# Patient Record
Sex: MALE | Race: WHITE | HISPANIC OR LATINO | URBAN - METROPOLITAN AREA
[De-identification: names, ages, dates, MRNs, and addresses within clinical notes are randomized per-mention and may not be internally consistent; named-entity substitution may affect disease eponyms.]

---

## 2017-06-10 ENCOUNTER — EMERGENCY (EMERGENCY)
Facility: HOSPITAL | Age: 27
LOS: 1 days | Discharge: HOME | End: 2017-06-10
Attending: EMERGENCY MEDICINE | Admitting: EMERGENCY MEDICINE
Payer: MEDICAID

## 2017-06-10 DIAGNOSIS — Z79.891 LONG TERM (CURRENT) USE OF OPIATE ANALGESIC: ICD-10-CM

## 2017-06-10 DIAGNOSIS — F10.129 ALCOHOL ABUSE WITH INTOXICATION, UNSPECIFIED: ICD-10-CM

## 2017-06-10 DIAGNOSIS — Z91.013 ALLERGY TO SEAFOOD: ICD-10-CM

## 2017-06-10 DIAGNOSIS — R41.82 ALTERED MENTAL STATUS, UNSPECIFIED: ICD-10-CM

## 2017-06-10 DIAGNOSIS — Z79.2 LONG TERM (CURRENT) USE OF ANTIBIOTICS: ICD-10-CM

## 2017-06-10 PROCEDURE — 99283 EMERGENCY DEPT VISIT LOW MDM: CPT | Mod: 25

## 2017-06-10 PROCEDURE — 99053 MED SERV 10PM-8AM 24 HR FAC: CPT

## 2017-06-11 VITALS
RESPIRATION RATE: 17 BRPM | TEMPERATURE: 98 F | HEART RATE: 74 BPM | DIASTOLIC BLOOD PRESSURE: 71 MMHG | OXYGEN SATURATION: 97 % | SYSTOLIC BLOOD PRESSURE: 113 MMHG

## 2017-06-11 VITALS
DIASTOLIC BLOOD PRESSURE: 76 MMHG | HEART RATE: 67 BPM | SYSTOLIC BLOOD PRESSURE: 118 MMHG | TEMPERATURE: 98 F | RESPIRATION RATE: 16 BRPM | OXYGEN SATURATION: 99 %
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
## 2017-06-11 NOTE — ED ADULT NURSE NOTE - CHPI ED SYMPTOMS NEG
no confusion/no disorientation/no vomiting/no weakness/no nausea/no abdominal pain/no fever/no chills/no abdominal distension

## 2017-06-11 NOTE — ED ADULT NURSE REASSESSMENT NOTE - NS ED NURSE REASSESS COMMENT FT1
pt. had some water and juice, tolerated well, pt. is sleeping, responds to all stimuli, breathing unlabored, no distress, no c/o pain, will monitor.
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
## 2017-06-11 NOTE — ED PROVIDER NOTE - MEDICAL DECISION MAKING DETAILS
clinically intoxicated without trauma.  observed for sobriety.  alert and oriented x3 with steady gait at time of discharge
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
## 2017-06-11 NOTE — ED PROVIDER NOTE - PHYSICAL EXAMINATION
· CONSTITUTIONAL: smells of alcohol, well nourished, awake, alert, oriented to person,  and in no apparent distress.  · ENMT: Airway patent, Nasal mucosa clear. Mouth with normal mucosa.  · HEAD: Head is atraumatic. Head shape is symmetrical.  · EYES: Clear bilaterally, pupils equal, round and reactive to light.  · CARDIAC: Normal rate, regular rhythm.  Heart sounds S1, S2.  · RESPIRATORY: Breath sounds clear and equal bilaterally.  · GASTROINTESTINAL: Abdomen soft, no guarding.  · MUSCULOSKELETAL: Spine appears normal, range of motion is not limited  · NEUROLOGICAL: Alert and oriented to self, speech slurred, not following formal neuro exam  · SKIN: Skin normal color for race, warm, dry and intact. No evidence of rash.  · PSYCHIATRIC: Alert and oriented to person. intoxicated affect. no apparent risk to self or others.  · HEME LYMPH: No adenopathy

## 2019-08-10 ENCOUNTER — EMERGENCY (EMERGENCY)
Facility: HOSPITAL | Age: 29
LOS: 1 days | Discharge: HOME | End: 2019-08-10
Attending: EMERGENCY MEDICINE | Admitting: EMERGENCY MEDICINE
Payer: COMMERCIAL

## 2019-08-10 VITALS
SYSTOLIC BLOOD PRESSURE: 148 MMHG | HEART RATE: 79 BPM | TEMPERATURE: 98 F | DIASTOLIC BLOOD PRESSURE: 78 MMHG | RESPIRATION RATE: 19 BRPM | OXYGEN SATURATION: 100 %

## 2019-08-10 VITALS
TEMPERATURE: 98 F | HEART RATE: 80 BPM | SYSTOLIC BLOOD PRESSURE: 112 MMHG | OXYGEN SATURATION: 98 % | RESPIRATION RATE: 16 BRPM | DIASTOLIC BLOOD PRESSURE: 63 MMHG

## 2019-08-10 DIAGNOSIS — F10.129 ALCOHOL ABUSE WITH INTOXICATION, UNSPECIFIED: ICD-10-CM

## 2019-08-10 DIAGNOSIS — R41.82 ALTERED MENTAL STATUS, UNSPECIFIED: ICD-10-CM

## 2019-08-10 PROCEDURE — 82962 GLUCOSE BLOOD TEST: CPT

## 2019-08-10 PROCEDURE — 99285 EMERGENCY DEPT VISIT HI MDM: CPT

## 2019-08-10 PROCEDURE — 99283 EMERGENCY DEPT VISIT LOW MDM: CPT

## 2019-08-10 NOTE — ED PROVIDER NOTE - PROGRESS NOTE DETAILS
Pt observed in ED. Walking around ED with steady gait and taking sandwiches Pt observed in ED. Walking around ED with steady gait and taking sandwiches from refrigerator. Discharged with outpatient follow up.
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
## 2019-08-10 NOTE — ED PROVIDER NOTE - NSFOLLOWUPCLINICS_GEN_ALL_ED_FT
[facility]  Family Medicine  [address]  [place], [place] [address]  Phone: [phone]  Fax:   Follow Up Time: 4-6 Days

## 2019-08-10 NOTE — ED PROVIDER NOTE - CONSTITUTIONAL, MLM
normal... Well appearing, well nourished, awake, alert, oriented and in no apparent distress. Head is atraumatic.
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
## 2019-08-16 ENCOUNTER — EMERGENCY (EMERGENCY)
Facility: HOSPITAL | Age: 29
LOS: 1 days | Discharge: HOME | End: 2019-08-16
Attending: EMERGENCY MEDICINE | Admitting: EMERGENCY MEDICINE
Payer: COMMERCIAL

## 2019-08-16 VITALS
RESPIRATION RATE: 18 BRPM | OXYGEN SATURATION: 95 % | WEIGHT: 184.97 LBS | SYSTOLIC BLOOD PRESSURE: 130 MMHG | DIASTOLIC BLOOD PRESSURE: 68 MMHG | TEMPERATURE: 98 F | HEART RATE: 113 BPM | HEIGHT: 66 IN

## 2019-08-16 VITALS
RESPIRATION RATE: 16 BRPM | DIASTOLIC BLOOD PRESSURE: 85 MMHG | SYSTOLIC BLOOD PRESSURE: 117 MMHG | OXYGEN SATURATION: 98 % | HEART RATE: 74 BPM | TEMPERATURE: 97 F

## 2019-08-16 PROCEDURE — 99283 EMERGENCY DEPT VISIT LOW MDM: CPT | Mod: 25

## 2019-08-16 PROCEDURE — 82962 GLUCOSE BLOOD TEST: CPT

## 2019-08-16 PROCEDURE — 73030 X-RAY EXAM OF SHOULDER: CPT

## 2019-08-16 PROCEDURE — 99053 MED SERV 10PM-8AM 24 HR FAC: CPT

## 2019-08-16 PROCEDURE — 73030 X-RAY EXAM OF SHOULDER: CPT | Mod: 26,RT

## 2019-08-16 PROCEDURE — 99285 EMERGENCY DEPT VISIT HI MDM: CPT

## 2019-08-16 PROCEDURE — 73030 X-RAY EXAM OF SHOULDER: CPT | Mod: 26

## 2019-08-16 RX ORDER — IBUPROFEN 200 MG
600 TABLET ORAL ONCE
Refills: 0 | Status: COMPLETED | OUTPATIENT
Start: 2019-08-16 | End: 2019-08-16

## 2019-08-16 RX ADMIN — Medication 600 MILLIGRAM(S): at 03:31

## 2019-08-16 RX ADMIN — Medication 600 MILLIGRAM(S): at 04:15

## 2019-08-16 NOTE — ED ADULT TRIAGE NOTE - CHIEF COMPLAINT QUOTE
Presents to ED for AMS.  States, "I want detox from alcohol, my last drink was 30 minutes ago.  I was drinking vodka."  Denies CP, SOB, vomiting, dizziness, abd pain, LOC, trauma.

## 2019-08-16 NOTE — ED PROVIDER NOTE - OBJECTIVE STATEMENT
29M hx etoh abuse, lupus, stating he is intoxicated. admits to drinking daily.  c/o R shoulder pain.  states ongoing for a few days, no trauma.

## 2019-08-16 NOTE — ED PROVIDER NOTE - DIAGNOSTIC INTERPRETATION
ER Physician: Le  INTERPRETATION:  no acute fracture; no soft tissue swelling noted; normal bony alignment.

## 2019-08-16 NOTE — ED PROVIDER NOTE - CARE PLAN
Principal Discharge DX:	Alcoholic intoxication without complication  Secondary Diagnosis:	Acute pain of right shoulder

## 2019-08-16 NOTE — ED PROVIDER NOTE - CLINICAL SUMMARY MEDICAL DECISION MAKING FREE TEXT BOX
etoh intox, no evidence of head injury, c/o R shoulder pain  -check xray  -reassess when clinically sober

## 2019-08-16 NOTE — ED ADULT NURSE NOTE - CHPI ED NUR SYMPTOMS NEG
no loss of consciousness/no numbness/no confusion/no blurred vision/no weakness/no change in level of consciousness/no dizziness/no nausea/no fever/no vomiting

## 2019-08-16 NOTE — ED PROVIDER NOTE - NSFOLLOWUPINSTRUCTIONS_ED_ALL_ED_FT
Alcohol Abuse    Alcohol intoxication occurs when the amount of alcohol that a person has consumed impairs his or her ability to mentally and physically function. Chronic alcohol consumption can also lead to a variety of health issues including neurological disease, stomach disease, heart disease, liver disease, etc. Do not drive after drinking alcohol. Drinking enough alcohol to end up in an Emergency Room suggests you may have an alcohol abuse problem. Seek help at a drug addiction center.    SEEK IMMEDIATE MEDICAL CARE IF YOU HAVE ANY OF THE FOLLOWING SYMPTOMS: seizures, vomiting blood, blood in your stool, lightheadedness/dizziness, or becoming shaky to tremulous when you stop drinking.     Shoulder Pain  Many things can cause shoulder pain, including:  An injury to the area.  Overuse of the shoulder.  Arthritis.  The source of the pain can be:  Inflammation.  An injury to the shoulder joint.  An injury to a tendon, ligament, or bone.  Follow these instructions at home:  Take these actions to help with your pain:  Squeeze a soft ball or a foam pad as much as possible. This helps to keep the shoulder from swelling. It also helps to strengthen the arm.  Take over-the-counter and prescription medicines only as told by your health care provider.  If directed, apply ice to the area:  Put ice in a plastic bag.  Place a towel between your skin and the bag.  Leave the ice on for 20 minutes, 2–3 times per day. Stop applying ice if it does not help with the pain.  If you were given a shoulder sling or immobilizer:  Wear it as told.  Remove it to shower or bathe.  Move your arm as little as possible, but keep your hand moving to prevent swelling.  Contact a health care provider if:  Your pain gets worse.  Your pain is not relieved with medicines.  New pain develops in your arm, hand, or fingers.  Get help right away if:  Your arm, hand, or fingers:  Tingle.  Become numb.  Become swollen.  Become painful.  Turn white or blue.  This information is not intended to replace advice given to you by your health care provider. Make sure you discuss any questions you have with your health care provider.

## 2019-08-16 NOTE — ED ADULT NURSE NOTE - OBJECTIVE STATEMENT
Patient came in to ED for altered mental status. Upon assessment, A+O x 4, no weakness, no facial droop. Reports having drink, vodka 30 mins ago. Also c/o pain to R shoulder, denies trauma. No bruising, full ROM to R shoulder noted.

## 2019-08-16 NOTE — ED ADULT NURSE NOTE - NSIMPLEMENTINTERV_GEN_ALL_ED
Implemented All Fall Risk Interventions:  [place] to call system. Call bell, personal items and telephone within reach. Instruct patient to call for assistance. Room bathroom lighting operational. Non-slip footwear when patient is off stretcher. Physically safe environment: no spills, clutter or unnecessary equipment. Stretcher in lowest position, wheels locked, appropriate side rails in place. Provide visual cue, wrist band, yellow gown, etc. Monitor gait and stability. Monitor for mental status changes and reorient to person, place, and time. Review medications for side effects contributing to fall risk. Reinforce activity limits and safety measures with patient and family.

## 2019-08-16 NOTE — ED PROVIDER NOTE - PROGRESS NOTE DETAILS
steady gait, fluent speech, given info regarding detox resources  I have discussed the discharge plan with the patient. The patient agrees with the plan, as discussed.  The patient understands Emergency Department diagnosis is a preliminary diagnosis often based on limited information and that the patient must adhere to the follow-up plan as discussed.  The patient understands that if the symptoms worsen the patient may return to the Emergency Department at any time for further evaluation and treatment.

## 2019-08-16 NOTE — ED ADULT NURSE REASSESSMENT NOTE - NS ED NURSE REASSESS COMMENT FT1
Patient ambulated to XRay independently. Now resting comfortably in stretcher, respirations even and regular. Pt sleepy but arousable to auditory stimuli.

## 2019-08-20 DIAGNOSIS — M25.511 PAIN IN RIGHT SHOULDER: ICD-10-CM

## 2019-08-20 DIAGNOSIS — F10.120 ALCOHOL ABUSE WITH INTOXICATION, UNCOMPLICATED: ICD-10-CM

## 2022-03-09 ENCOUNTER — EMERGENCY (EMERGENCY)
Facility: HOSPITAL | Age: 32
LOS: 1 days | Discharge: HOME | End: 2022-03-09
Attending: EMERGENCY MEDICINE | Admitting: EMERGENCY MEDICINE
Payer: COMMERCIAL

## 2022-03-09 VITALS
HEIGHT: 66 IN | HEART RATE: 85 BPM | TEMPERATURE: 98 F | RESPIRATION RATE: 17 BRPM | WEIGHT: 199.96 LBS | DIASTOLIC BLOOD PRESSURE: 82 MMHG | OXYGEN SATURATION: 97 % | SYSTOLIC BLOOD PRESSURE: 110 MMHG

## 2022-03-09 DIAGNOSIS — R51.9 HEADACHE, UNSPECIFIED: ICD-10-CM

## 2022-03-09 DIAGNOSIS — Z87.828 PERSONAL HISTORY OF OTHER (HEALED) PHYSICAL INJURY AND TRAUMA: ICD-10-CM

## 2022-03-09 DIAGNOSIS — Z91.013 ALLERGY TO SEAFOOD: ICD-10-CM

## 2022-03-09 DIAGNOSIS — F10.129 ALCOHOL ABUSE WITH INTOXICATION, UNSPECIFIED: ICD-10-CM

## 2022-03-09 LAB
ALBUMIN SERPL ELPH-MCNC: 3.2 G/DL — LOW (ref 3.3–5)
ALP SERPL-CCNC: 154 U/L — HIGH (ref 40–120)
ALT FLD-CCNC: 25 U/L — SIGNIFICANT CHANGE UP (ref 10–45)
ANION GAP SERPL CALC-SCNC: 13 MMOL/L — SIGNIFICANT CHANGE UP (ref 5–17)
AST SERPL-CCNC: 67 U/L — HIGH (ref 10–40)
BILIRUB SERPL-MCNC: 0.7 MG/DL — SIGNIFICANT CHANGE UP (ref 0.2–1.2)
BUN SERPL-MCNC: 8 MG/DL — SIGNIFICANT CHANGE UP (ref 7–23)
CALCIUM SERPL-MCNC: 8.8 MG/DL — SIGNIFICANT CHANGE UP (ref 8.4–10.5)
CHLORIDE SERPL-SCNC: 107 MMOL/L — SIGNIFICANT CHANGE UP (ref 96–108)
CO2 SERPL-SCNC: 23 MMOL/L — SIGNIFICANT CHANGE UP (ref 22–31)
CREAT SERPL-MCNC: 0.55 MG/DL — SIGNIFICANT CHANGE UP (ref 0.5–1.3)
EGFR: 136 ML/MIN/1.73M2 — SIGNIFICANT CHANGE UP
ETHANOL SERPL-MCNC: 359 MG/DL — HIGH (ref 0–10)
GLUCOSE SERPL-MCNC: 92 MG/DL — SIGNIFICANT CHANGE UP (ref 70–99)
HCT VFR BLD CALC: 36.8 % — LOW (ref 39–50)
HGB BLD-MCNC: 12.1 G/DL — LOW (ref 13–17)
LIDOCAIN IGE QN: 133 U/L — HIGH (ref 7–60)
MCHC RBC-ENTMCNC: 32.9 GM/DL — SIGNIFICANT CHANGE UP (ref 32–36)
MCHC RBC-ENTMCNC: 34.5 PG — HIGH (ref 27–34)
MCV RBC AUTO: 104.8 FL — HIGH (ref 80–100)
PLATELET # BLD AUTO: 148 K/UL — LOW (ref 150–400)
POTASSIUM SERPL-MCNC: 3.6 MMOL/L — SIGNIFICANT CHANGE UP (ref 3.5–5.3)
POTASSIUM SERPL-SCNC: 3.6 MMOL/L — SIGNIFICANT CHANGE UP (ref 3.5–5.3)
PROT SERPL-MCNC: 8.2 G/DL — SIGNIFICANT CHANGE UP (ref 6–8.3)
RBC # BLD: 3.51 M/UL — LOW (ref 4.2–5.8)
RBC # FLD: 14.4 % — SIGNIFICANT CHANGE UP (ref 10.3–14.5)
SODIUM SERPL-SCNC: 143 MMOL/L — SIGNIFICANT CHANGE UP (ref 135–145)
WBC # BLD: 6.09 K/UL — SIGNIFICANT CHANGE UP (ref 3.8–10.5)
WBC # FLD AUTO: 6.09 K/UL — SIGNIFICANT CHANGE UP (ref 3.8–10.5)

## 2022-03-09 PROCEDURE — 99285 EMERGENCY DEPT VISIT HI MDM: CPT
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
## 2022-03-10 VITALS
HEART RATE: 82 BPM | RESPIRATION RATE: 18 BRPM | OXYGEN SATURATION: 98 % | DIASTOLIC BLOOD PRESSURE: 70 MMHG | SYSTOLIC BLOOD PRESSURE: 122 MMHG | TEMPERATURE: 98 F

## 2022-03-10 PROBLEM — M32.9 SYSTEMIC LUPUS ERYTHEMATOSUS, UNSPECIFIED: Chronic | Status: ACTIVE | Noted: 2019-08-16

## 2022-03-10 LAB
ANISOCYTOSIS BLD QL: SLIGHT — SIGNIFICANT CHANGE UP
BASOPHILS # BLD AUTO: 0 K/UL — SIGNIFICANT CHANGE UP (ref 0–0.2)
BASOPHILS NFR BLD AUTO: 0 % — SIGNIFICANT CHANGE UP (ref 0–2)
EOSINOPHIL # BLD AUTO: 0 K/UL — SIGNIFICANT CHANGE UP (ref 0–0.5)
EOSINOPHIL NFR BLD AUTO: 0 % — SIGNIFICANT CHANGE UP (ref 0–6)
GIANT PLATELETS BLD QL SMEAR: PRESENT — SIGNIFICANT CHANGE UP
LYMPHOCYTES # BLD AUTO: 2.01 K/UL — SIGNIFICANT CHANGE UP (ref 1–3.3)
LYMPHOCYTES # BLD AUTO: 33 % — SIGNIFICANT CHANGE UP (ref 13–44)
MACROCYTES BLD QL: SIGNIFICANT CHANGE UP
MANUAL SMEAR VERIFICATION: SIGNIFICANT CHANGE UP
MONOCYTES # BLD AUTO: 0.16 K/UL — SIGNIFICANT CHANGE UP (ref 0–0.9)
MONOCYTES NFR BLD AUTO: 2.7 % — SIGNIFICANT CHANGE UP (ref 2–14)
NEUTROPHILS # BLD AUTO: 3.92 K/UL — SIGNIFICANT CHANGE UP (ref 1.8–7.4)
NEUTROPHILS NFR BLD AUTO: 62.5 % — SIGNIFICANT CHANGE UP (ref 43–77)
NEUTS BAND # BLD: 1.8 % — SIGNIFICANT CHANGE UP (ref 0–8)
OVALOCYTES BLD QL SMEAR: SLIGHT — SIGNIFICANT CHANGE UP
PLAT MORPH BLD: NORMAL — SIGNIFICANT CHANGE UP
POLYCHROMASIA BLD QL SMEAR: SLIGHT — SIGNIFICANT CHANGE UP
RBC BLD AUTO: ABNORMAL
SMUDGE CELLS # BLD: PRESENT — SIGNIFICANT CHANGE UP

## 2022-03-10 PROCEDURE — 85025 COMPLETE CBC W/AUTO DIFF WBC: CPT

## 2022-03-10 PROCEDURE — 72125 CT NECK SPINE W/O DYE: CPT

## 2022-03-10 PROCEDURE — 80053 COMPREHEN METABOLIC PANEL: CPT

## 2022-03-10 PROCEDURE — 36415 COLL VENOUS BLD VENIPUNCTURE: CPT

## 2022-03-10 PROCEDURE — 70450 CT HEAD/BRAIN W/O DYE: CPT | Mod: 26

## 2022-03-10 PROCEDURE — 80307 DRUG TEST PRSMV CHEM ANLYZR: CPT

## 2022-03-10 PROCEDURE — 70450 CT HEAD/BRAIN W/O DYE: CPT

## 2022-03-10 PROCEDURE — 72125 CT NECK SPINE W/O DYE: CPT | Mod: 26

## 2022-03-10 PROCEDURE — 82962 GLUCOSE BLOOD TEST: CPT

## 2022-03-10 PROCEDURE — 99285 EMERGENCY DEPT VISIT HI MDM: CPT | Mod: 25

## 2022-03-10 PROCEDURE — 83690 ASSAY OF LIPASE: CPT
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
## 2022-03-10 NOTE — ED PROVIDER NOTE - NSFOLLOWUPINSTRUCTIONS_ED_ALL_ED_FT
Please follow up with your neurologist for further evaluation and treatment as scheduled.                                                                                                                     Alcohol Intoxication      Alcohol intoxication occurs when a person no longer thinks clearly or functions well (becomes impaired) after drinking alcohol. Intoxication can occur with just one drink. The legal definition of alcohol intoxication depends on the amount of alcohol in the blood (blood alcohol concentration, JAVI). JAVI of 80–100 mg/dL or higher is commonly considered legally intoxicated. The level of impairment depends on:  •The amount of alcohol the person had.      •The person's age, gender, and weight.      •How often the person drinks.      •Whether the person has other medical conditions, such as diabetes, seizures, or a heart condition.      Alcohol intoxication can range from mild to severe. The condition can be dangerous, especially if the person:  •Also took certain drugs or prescription medicines.    •Drinks a large amount of alcohol in a short period of time (binge drinks).  •For women, binge drinking is having four or more drinks at one time.      •For men, binge drinking is having five or more drinks at one time.        If you or anyone around you appears intoxicated, speak up and act.      What are the causes?    This condition is caused by drinking alcohol.      What increases the risk?    The following factors may make you more likely to develop this condition:  •Peer pressure in young adults.      •Difficulty managing stress.      •History of drug or alcohol abuse.      •Combining alcohol with drugs.      •Family history of drug or alcohol abuse.      •Low body weight.      •Binge drinking.        What are the signs or symptoms?    Symptoms of alcohol intoxication can vary from person to person. Symptoms can be mild, moderate, or severe.    Symptoms of mild alcohol intoxication may include:  •Feeling relaxed or sleepy.      •Having mild difficulty with coordination, speech, memory, or attention.      Symptoms of moderate alcohol intoxication may include:  •Extreme emotions, like anger or sadness.      •Moderate difficulty with coordination, speech, memory, or attention.      Symptoms of severe alcohol intoxication may include:  •Severe difficulty with coordination, speech, memory, or attention.      •Passing out.      •Vomiting.      •Confusion.      •Slow breathing.      •Coma.      Intoxication can change quickly from mild to severe. It can cause coma or death, especially in people who are not exposed to alcohol often.      How is this diagnosed?    Your health care provider will ask you how much alcohol you drank and what kind you had. Intoxication may also be diagnosed based on:  •Your symptoms and medical history.      •A physical exam.      •A blood test that measures JAVI.      •A smell of alcohol on your breath.        How is this treated?    Treatment for alcohol intoxication may include:  •Being monitored in an emergency department, hospital, or treatment center until your JAVI comes down and it is safe for you to go home.      •IV fluids to prevent or treat loss of fluid in the body (dehydration).      •Medicine to treat nausea or vomiting or to get rid of alcohol in the body.      •Counseling (brief intervention) about the dangers of using alcohol.      •Treatment for substance use disorder.      •Oxygen therapy or a breathing machine (ventilator).      Long-term (chronic) exposure to alcohol can have long-term effects on your brain, heart, and gastrointestinal system. These effects can be serious and may also require treatment.      Follow these instructions at home:       Eating and drinking    • Do not drink alcohol if:  •Your health care provider tells you not to drink.      •You are pregnant, may be pregnant, or are planning to become pregnant.      •You are under the legal drinking age (21 years old in the [place]      •You are taking medicines that should not be taken with alcohol.      •You have a medical condition, and alcohol makes it worse.      •You need to drive or perform activities that require you to be alert.      •You have substance use disorder.      •Ask your health care provider if alcohol is safe for you. If your health care provider allows you to drink alcohol, limit how much you have. You may drink:  •0–1 drink a day for women.     • 0–2 drinks a day for men.   •Be aware of how much alcohol is in your drink. In the [place] one drink equals one 12 oz bottle of beer (355 mL), one 5 oz glass of wine (148 mL), or one 1½ oz shot of hard liquor (44 mL).          •Avoid drinking alcohol on an empty stomach.      •Stay hydrated. Drink enough fluid to keep your urine pale yellow. Avoid caffeine because it can dehydrate you.      •Avoid drinking more than one drink per hour.      •When having multiple drinks, drink water or a non-alcoholic beverage between alcoholic drinks.      General instructions     •Take over-the-counter and prescription medicines only as told by your health care provider.      • Do not drive after drinking any amount of alcohol. Plan for a designated  or another way to go home.      •Have someone responsible stay with you while you are intoxicated. You should not be left alone.      •Keep all follow-up visits as told by your health care provider. This is important.        Contact a health care provider if:    •You do not feel better after a few days.      •You have problems at work, at school, or at home due to drinking.        Get help right away if:  •You have any of the following:  •Moderate to severe trouble with coordination, speech, memory, or attention.      •Trouble staying awake.      •Severe confusion.      •A seizure.      •Light-headedness.      •Fainting.      •Vomiting bright red blood or material that looks like coffee grounds.      •Bloody stool (feces). The blood may make your stool bright red, black, or tarry. It may also smell bad.      •Shakiness when trying to stop drinking.      •Thoughts about hurting yourself or others.        If you ever feel like you may hurt yourself or others, or have thoughts about taking your own life, get help right away. You can go to your nearest emergency department or call:   • Your local emergency services (911 in the [place]       • A suicide crisis helpline, such as the National Suicide Prevention Lifeline at 1-841.300.7060. This is open 24 hours a day.         Summary    •Alcohol intoxication occurs when a person no longer thinks clearly or functions well after drinking alcohol.      •If your health care provider says that alcohol is safe for you, limit alcohol intake to no more than 1 drink a day for women (no drinks if you are pregnant) and 2 drinks a day for men. One drink equals 12 oz of beer, 5 oz of wine, or 1½ oz of hard liquor.      •Contact your health care provider if drinking has caused you problems at work, school, or home.      •Get help right away if you have thoughts about hurting yourself or others.      This information is not intended to replace advice given to you by your health care provider. Make sure you discuss any questions you have with your health care provider.
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
## 2022-03-10 NOTE — ED PROVIDER NOTE - PATIENT PORTAL LINK FT
You can access the FollowMyHealth Patient Portal offered by [facility] by registering at the following website: [email or web address]. By joining [email or web address]’s FollowMyHealth portal, you will also be able to view your health information using other applications (apps) compatible with our system.

## 2022-03-10 NOTE — ED PROVIDER NOTE - OBJECTIVE STATEMENT
As per triage note, pt walked into ER c/o HA after his fall last month , also admits drinking alcohol.   As soon as pt was placed on the stretcher he fell asleep .     On my exam- pt is poor historian, intoxicated and sleepy, but arousal to stimulation.  No obvious signs of injury or trauma noted.

## 2022-03-10 NOTE — ED ADULT NURSE NOTE - NSIMPLEMENTINTERV_GEN_ALL_ED
Implemented All Fall Risk Interventions:  [place] to call system. Call bell, personal items and telephone within reach. Instruct patient to call for assistance. Room bathroom lighting operational. Non-slip footwear when patient is off stretcher. Physically safe environment: no spills, clutter or unnecessary equipment. Stretcher in lowest position, wheels locked, appropriate side rails in place. Provide visual cue, wrist band, yellow gown, etc. Monitor gait and stability. Monitor for mental status changes and reorient to person, place, and time. Review medications for side effects contributing to fall risk. Reinforce activity limits and safety measures with patient and family.

## 2022-10-01 PROCEDURE — HZ42ZZZ GROUP COUNSELING FOR SUBSTANCE ABUSE TREATMENT, COGNITIVE-BEHAVIORAL: ICD-10-PCS | Performed by: PSYCHIATRY & NEUROLOGY

## 2022-10-04 ENCOUNTER — HOSPITAL ENCOUNTER (INPATIENT)
Dept: HOSPITAL 74 - YASAS | Age: 32
LOS: 14 days | Discharge: HOME | DRG: 772 | End: 2022-10-18
Attending: PSYCHIATRY & NEUROLOGY | Admitting: ALLERGY & IMMUNOLOGY
Payer: COMMERCIAL

## 2022-10-04 VITALS — BODY MASS INDEX: 29.7 KG/M2

## 2022-10-04 DIAGNOSIS — F10.20: Primary | ICD-10-CM

## 2022-10-04 DIAGNOSIS — F32.A: ICD-10-CM

## 2022-10-04 DIAGNOSIS — E87.6: ICD-10-CM

## 2022-10-04 DIAGNOSIS — Z99.89: ICD-10-CM

## 2022-10-04 DIAGNOSIS — G47.00: ICD-10-CM

## 2022-10-04 DIAGNOSIS — S93.402D: ICD-10-CM

## 2022-10-04 DIAGNOSIS — N39.0: ICD-10-CM
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
## 2022-10-04 DIAGNOSIS — F41.9: ICD-10-CM

## 2022-10-04 PROCEDURE — U0005 INFEC AGEN DETEC AMPLI PROBE: HCPCS

## 2022-10-04 PROCEDURE — U0003 INFECTIOUS AGENT DETECTION BY NUCLEIC ACID (DNA OR RNA); SEVERE ACUTE RESPIRATORY SYNDROME CORONAVIRUS 2 (SARS-COV-2) (CORONAVIRUS DISEASE [COVID-19]), AMPLIFIED PROBE TECHNIQUE, MAKING USE OF HIGH THROUGHPUT TECHNOLOGIES AS DESCRIBED BY CMS-2020-01-R: HCPCS

## 2022-10-05 LAB
ALBUMIN SERPL-MCNC: 2.3 G/DL (ref 3.4–5)
ALP SERPL-CCNC: 103 U/L (ref 45–117)
ALT SERPL-CCNC: 120 U/L (ref 13–61)
ANION GAP SERPL CALC-SCNC: 12 MMOL/L (ref 8–16)
APPEARANCE UR: (no result)
AST SERPL-CCNC: 166 U/L (ref 15–37)
BACTERIA # UR AUTO: (no result) /UL (ref 0–1359)
BILIRUB SERPL-MCNC: 1.1 MG/DL (ref 0.2–1)
BILIRUB UR STRIP.AUTO-MCNC: (no result) MG/DL
BUN SERPL-MCNC: 13.6 MG/DL (ref 7–18)
CALCIUM SERPL-MCNC: 9.2 MG/DL (ref 8.5–10.1)
CASTS URNS QL MICRO: 17 /UL (ref 0–3.1)
CHLORIDE SERPL-SCNC: 94 MMOL/L (ref 98–107)
CO2 SERPL-SCNC: 29 MMOL/L (ref 21–32)
COLOR UR: (no result)
CREAT SERPL-MCNC: 0.7 MG/DL (ref 0.55–1.3)
DEPRECATED RDW RBC AUTO: 14.5 % (ref 11.9–15.9)
EPITH CASTS URNS QL MICRO: >36 /UL (ref 0–25.1)
GLUCOSE SERPL-MCNC: 104 MG/DL (ref 74–106)
HCT VFR BLD CALC: 35.3 % (ref 35.4–49)
HGB BLD-MCNC: 11.8 GM/DL (ref 11.7–16.9)
KETONES UR QL STRIP: (no result)
LEUKOCYTE ESTERASE UR QL STRIP.AUTO: NEGATIVE
MCH RBC QN AUTO: 30.9 PG (ref 25.7–33.7)
MCHC RBC AUTO-ENTMCNC: 33.3 G/DL (ref 32–35.9)
MCV RBC: 92.9 FL (ref 80–96)
NITRITE UR QL STRIP: POSITIVE
PH UR: 6 [PH] (ref 5–8)
PLATELET # BLD AUTO: 131 10^3/UL (ref 134–434)
PMV BLD: 10.2 FL (ref 7.5–11.1)
PROT SERPL-MCNC: 6.8 G/DL (ref 6.4–8.2)
PROT UR QL STRIP: (no result)
PROT UR QL STRIP: NEGATIVE
RBC # BLD AUTO: 17 /UL (ref 0–23.9)
RBC # BLD AUTO: 3.8 M/MM3 (ref 4–5.6)
SODIUM SERPL-SCNC: 135 MMOL/L (ref 136–145)
SP GR UR: 1.03 (ref 1.01–1.03)
TREPONEMA PALLIDUM AB [UNITS/VOLUME] IN SERUM OR PLASMA BY IMMUNOASSAY: (no result)
UROBILINOGEN UR STRIP-MCNC: 2 MG/DL (ref 0.2–1)
WBC # BLD AUTO: 8.7 K/MM3 (ref 4–10)
WBC # UR AUTO: 15 /UL (ref 0–25.8)

## 2022-10-05 RX ADMIN — IBUPROFEN PRN MG: 400 TABLET, FILM COATED ORAL at 01:08

## 2022-10-05 RX ADMIN — QUETIAPINE FUMARATE SCH MG: 100 TABLET ORAL at 21:16

## 2022-10-05 RX ADMIN — HYDROXYZINE PAMOATE PRN MG: 25 CAPSULE ORAL at 10:14

## 2022-10-05 RX ADMIN — IBUPROFEN PRN MG: 400 TABLET, FILM COATED ORAL at 21:20

## 2022-10-05 RX ADMIN — HYDROXYZINE PAMOATE PRN MG: 25 CAPSULE ORAL at 01:08

## 2022-10-05 RX ADMIN — HYDROXYZINE PAMOATE PRN MG: 25 CAPSULE ORAL at 21:16

## 2022-10-05 RX ADMIN — ACETAMINOPHEN PRN MG: 325 TABLET ORAL at 10:15

## 2022-10-05 RX ADMIN — Medication SCH MG: at 21:22

## 2022-10-05 RX ADMIN — Medication SCH TAB: at 10:14

## 2022-10-06 RX ADMIN — QUETIAPINE FUMARATE SCH MG: 100 TABLET ORAL at 21:20

## 2022-10-06 RX ADMIN — HYDROXYZINE PAMOATE PRN MG: 25 CAPSULE ORAL at 10:54

## 2022-10-06 RX ADMIN — Medication SCH TAB: at 10:54

## 2022-10-06 RX ADMIN — Medication SCH MG: at 21:21

## 2022-10-06 RX ADMIN — Medication PRN MG: at 21:21

## 2022-10-06 RX ADMIN — IBUPROFEN PRN MG: 400 TABLET, FILM COATED ORAL at 10:54

## 2022-10-07 RX ADMIN — Medication PRN MG: at 21:26

## 2022-10-07 RX ADMIN — IBUPROFEN PRN MG: 600 TABLET, FILM COATED ORAL at 09:56

## 2022-10-07 RX ADMIN — HYDROXYZINE PAMOATE PRN MG: 25 CAPSULE ORAL at 21:26

## 2022-10-07 RX ADMIN — QUETIAPINE FUMARATE SCH MG: 100 TABLET ORAL at 21:25

## 2022-10-07 RX ADMIN — DOXYCYCLINE HYCLATE SCH MG: 100 TABLET, COATED ORAL at 17:31

## 2022-10-07 RX ADMIN — HYDROXYZINE PAMOATE PRN MG: 25 CAPSULE ORAL at 09:56

## 2022-10-07 RX ADMIN — Medication SCH MG: at 21:25

## 2022-10-07 RX ADMIN — Medication SCH TAB: at 09:55

## 2022-10-07 RX ADMIN — IBUPROFEN PRN MG: 600 TABLET, FILM COATED ORAL at 21:25

## 2022-10-07 RX ADMIN — POTASSIUM CHLORIDE SCH MEQ: 20 SOLUTION ORAL at 22:49

## 2022-10-08 RX ADMIN — IBUPROFEN PRN MG: 600 TABLET, FILM COATED ORAL at 21:17

## 2022-10-08 RX ADMIN — DOXYCYCLINE HYCLATE SCH MG: 100 TABLET, COATED ORAL at 18:09

## 2022-10-08 RX ADMIN — Medication SCH TAB: at 09:59

## 2022-10-08 RX ADMIN — QUETIAPINE FUMARATE SCH MG: 100 TABLET ORAL at 21:18

## 2022-10-08 RX ADMIN — Medication SCH MG: at 21:18

## 2022-10-08 RX ADMIN — IBUPROFEN PRN MG: 600 TABLET, FILM COATED ORAL at 10:05

## 2022-10-08 RX ADMIN — POTASSIUM CHLORIDE SCH MEQ: 20 SOLUTION ORAL at 10:02

## 2022-10-08 RX ADMIN — Medication PRN MG: at 21:18

## 2022-10-08 RX ADMIN — DOXYCYCLINE HYCLATE SCH MG: 100 TABLET, COATED ORAL at 10:03

## 2022-10-09 RX ADMIN — IBUPROFEN PRN MG: 600 TABLET, FILM COATED ORAL at 09:39

## 2022-10-09 RX ADMIN — DOXYCYCLINE HYCLATE SCH MG: 100 TABLET, COATED ORAL at 09:37

## 2022-10-09 RX ADMIN — Medication PRN MG: at 22:21

## 2022-10-09 RX ADMIN — Medication SCH MG: at 22:20

## 2022-10-09 RX ADMIN — HYDROXYZINE PAMOATE PRN MG: 25 CAPSULE ORAL at 22:21

## 2022-10-09 RX ADMIN — Medication SCH TAB: at 09:37

## 2022-10-09 RX ADMIN — DOXYCYCLINE HYCLATE SCH MG: 100 TABLET, COATED ORAL at 17:27

## 2022-10-09 RX ADMIN — QUETIAPINE FUMARATE SCH MG: 100 TABLET ORAL at 22:20

## 2022-10-10 RX ADMIN — Medication SCH TAB: at 10:05

## 2022-10-10 RX ADMIN — DOXYCYCLINE HYCLATE SCH MG: 100 TABLET, COATED ORAL at 10:06

## 2022-10-10 RX ADMIN — QUETIAPINE FUMARATE SCH MG: 100 TABLET ORAL at 22:00

## 2022-10-10 RX ADMIN — Medication SCH MG: at 22:00

## 2022-10-10 RX ADMIN — DOXYCYCLINE HYCLATE SCH MG: 100 TABLET, COATED ORAL at 19:08

## 2022-10-10 RX ADMIN — IBUPROFEN PRN MG: 600 TABLET, FILM COATED ORAL at 10:07

## 2022-10-11 RX ADMIN — DOXYCYCLINE HYCLATE SCH MG: 100 TABLET, COATED ORAL at 09:55

## 2022-10-11 RX ADMIN — IBUPROFEN PRN MG: 600 TABLET, FILM COATED ORAL at 17:06

## 2022-10-11 RX ADMIN — DOXYCYCLINE HYCLATE SCH MG: 100 TABLET, COATED ORAL at 17:05

## 2022-10-11 RX ADMIN — QUETIAPINE FUMARATE SCH MG: 100 TABLET ORAL at 22:18

## 2022-10-11 RX ADMIN — Medication SCH TAB: at 09:54

## 2022-10-11 RX ADMIN — Medication SCH MG: at 22:18

## 2022-10-11 RX ADMIN — IBUPROFEN PRN MG: 600 TABLET, FILM COATED ORAL at 09:54

## 2022-10-12 RX ADMIN — Medication SCH TAB: at 10:27

## 2022-10-12 RX ADMIN — DOXYCYCLINE HYCLATE SCH MG: 100 TABLET, COATED ORAL at 17:47

## 2022-10-12 RX ADMIN — DOXYCYCLINE HYCLATE SCH MG: 100 TABLET, COATED ORAL at 10:27

## 2022-10-12 RX ADMIN — QUETIAPINE FUMARATE SCH MG: 100 TABLET ORAL at 21:25

## 2022-10-12 RX ADMIN — ACETAMINOPHEN PRN MG: 325 TABLET ORAL at 21:25

## 2022-10-12 RX ADMIN — Medication SCH MG: at 21:25

## 2022-10-12 RX ADMIN — IBUPROFEN PRN MG: 600 TABLET, FILM COATED ORAL at 10:28

## 2022-10-13 RX ADMIN — DOXYCYCLINE HYCLATE SCH MG: 100 TABLET, COATED ORAL at 18:09

## 2022-10-13 RX ADMIN — DOXYCYCLINE HYCLATE SCH MG: 100 TABLET, COATED ORAL at 10:00

## 2022-10-13 RX ADMIN — Medication SCH TAB: at 10:00

## 2022-10-13 RX ADMIN — HYDROXYZINE PAMOATE PRN MG: 25 CAPSULE ORAL at 21:16

## 2022-10-13 RX ADMIN — QUETIAPINE FUMARATE SCH MG: 100 TABLET ORAL at 21:16

## 2022-10-13 RX ADMIN — Medication SCH MG: at 21:16

## 2022-10-13 RX ADMIN — Medication PRN MG: at 21:16

## 2022-10-13 RX ADMIN — IBUPROFEN PRN MG: 600 TABLET, FILM COATED ORAL at 06:17

## 2022-10-13 RX ADMIN — IBUPROFEN PRN MG: 600 TABLET, FILM COATED ORAL at 21:17

## 2022-10-14 RX ADMIN — DOXYCYCLINE HYCLATE SCH MG: 100 TABLET, COATED ORAL at 10:13

## 2022-10-14 RX ADMIN — IBUPROFEN PRN MG: 600 TABLET, FILM COATED ORAL at 07:08

## 2022-10-14 RX ADMIN — QUETIAPINE FUMARATE SCH MG: 100 TABLET ORAL at 21:22

## 2022-10-14 RX ADMIN — Medication SCH MG: at 21:22

## 2022-10-14 RX ADMIN — Medication SCH TAB: at 10:13

## 2022-10-14 RX ADMIN — IBUPROFEN PRN MG: 600 TABLET, FILM COATED ORAL at 21:22

## 2022-10-15 RX ADMIN — Medication SCH MG: at 21:29

## 2022-10-15 RX ADMIN — Medication SCH TAB: at 10:01

## 2022-10-15 RX ADMIN — Medication PRN MG: at 21:29

## 2022-10-15 RX ADMIN — Medication SCH EACH: at 22:28

## 2022-10-15 RX ADMIN — LIDOCAINE SCH PATCH: 50 PATCH TOPICAL at 17:21

## 2022-10-15 RX ADMIN — ACETAMINOPHEN PRN MG: 325 TABLET ORAL at 10:02

## 2022-10-15 RX ADMIN — QUETIAPINE FUMARATE SCH MG: 100 TABLET ORAL at 21:29

## 2022-10-15 RX ADMIN — IBUPROFEN PRN MG: 600 TABLET, FILM COATED ORAL at 06:55

## 2022-10-15 RX ADMIN — IBUPROFEN PRN MG: 600 TABLET, FILM COATED ORAL at 21:30

## 2022-10-16 RX ADMIN — LIDOCAINE SCH PATCH: 50 PATCH TOPICAL at 09:43

## 2022-10-16 RX ADMIN — IBUPROFEN PRN MG: 600 TABLET, FILM COATED ORAL at 06:46

## 2022-10-16 RX ADMIN — Medication SCH EACH: at 21:18

## 2022-10-16 RX ADMIN — Medication PRN MG: at 21:17

## 2022-10-16 RX ADMIN — IBUPROFEN PRN MG: 600 TABLET, FILM COATED ORAL at 21:18

## 2022-10-16 RX ADMIN — ACETAMINOPHEN PRN MG: 325 TABLET ORAL at 09:44

## 2022-10-16 RX ADMIN — QUETIAPINE FUMARATE SCH MG: 100 TABLET ORAL at 21:18

## 2022-10-16 RX ADMIN — Medication SCH TAB: at 09:43

## 2022-10-16 RX ADMIN — Medication SCH MG: at 21:17

## 2022-10-17 VITALS
HEART RATE: 77 BPM | DIASTOLIC BLOOD PRESSURE: 60 MMHG | RESPIRATION RATE: 17 BRPM | SYSTOLIC BLOOD PRESSURE: 101 MMHG | TEMPERATURE: 97.1 F

## 2022-10-17 RX ADMIN — HYDROXYZINE PAMOATE PRN MG: 25 CAPSULE ORAL at 21:30

## 2022-10-17 RX ADMIN — Medication PRN MG: at 21:29

## 2022-10-17 RX ADMIN — QUETIAPINE FUMARATE SCH MG: 100 TABLET ORAL at 21:29

## 2022-10-17 RX ADMIN — Medication SCH TAB: at 10:08

## 2022-10-17 RX ADMIN — LIDOCAINE SCH PATCH: 50 PATCH TOPICAL at 10:09

## 2022-10-17 RX ADMIN — IBUPROFEN PRN MG: 600 TABLET, FILM COATED ORAL at 08:43

## 2022-10-17 RX ADMIN — Medication SCH MG: at 21:29

## 2022-10-18 ENCOUNTER — EMERGENCY (EMERGENCY)
Facility: HOSPITAL | Age: 32
LOS: 1 days | Discharge: HOME | End: 2022-10-18
Attending: STUDENT IN AN ORGANIZED HEALTH CARE EDUCATION/TRAINING PROGRAM | Admitting: STUDENT IN AN ORGANIZED HEALTH CARE EDUCATION/TRAINING PROGRAM
Payer: MEDICAID

## 2022-10-18 VITALS
HEIGHT: 66 IN | DIASTOLIC BLOOD PRESSURE: 76 MMHG | RESPIRATION RATE: 18 BRPM | OXYGEN SATURATION: 99 % | HEART RATE: 102 BPM | SYSTOLIC BLOOD PRESSURE: 115 MMHG | TEMPERATURE: 98 F

## 2022-10-18 PROCEDURE — 99285 EMERGENCY DEPT VISIT HI MDM: CPT

## 2022-10-18 RX ADMIN — LIDOCAINE SCH: 50 PATCH TOPICAL at 09:20

## 2022-10-18 RX ADMIN — IBUPROFEN PRN MG: 600 TABLET, FILM COATED ORAL at 07:11

## 2022-10-18 RX ADMIN — Medication SCH TAB: at 09:19

## 2022-10-19 VITALS
TEMPERATURE: 98 F | DIASTOLIC BLOOD PRESSURE: 74 MMHG | SYSTOLIC BLOOD PRESSURE: 111 MMHG | OXYGEN SATURATION: 99 % | HEART RATE: 100 BPM | RESPIRATION RATE: 16 BRPM

## 2022-10-19 PROCEDURE — 73562 X-RAY EXAM OF KNEE 3: CPT | Mod: 26

## 2022-10-19 PROCEDURE — 73600 X-RAY EXAM OF ANKLE: CPT | Mod: 26,LT

## 2022-10-19 PROCEDURE — 70450 CT HEAD/BRAIN W/O DYE: CPT | Mod: 26,MA

## 2022-10-19 PROCEDURE — 73600 X-RAY EXAM OF ANKLE: CPT

## 2022-10-19 PROCEDURE — 73600 X-RAY EXAM OF ANKLE: CPT | Mod: 26

## 2022-10-19 PROCEDURE — 73562 X-RAY EXAM OF KNEE 3: CPT

## 2022-10-19 PROCEDURE — 70450 CT HEAD/BRAIN W/O DYE: CPT | Mod: MA

## 2022-10-19 PROCEDURE — 99285 EMERGENCY DEPT VISIT HI MDM: CPT | Mod: 25

## 2022-10-19 PROCEDURE — 73562 X-RAY EXAM OF KNEE 3: CPT | Mod: 26,RT

## 2022-10-19 NOTE — ED PROVIDER NOTE - PHYSICAL EXAMINATION
General: obtunded. Well developed, hydrated and nourished. Appears stated age. smells like etoh  Skin: Skin in warm, dry and intact without rashes or lesions. Appropriate color for ethnicity  HENMT: head normocephalic and atraumatic; bilateral external ears without swelling. no nasal discharge. moist oral mucosa. supple neck, trachea midline  EYES: Conjunctiva clear. nonicteric sclera. EOM intact, Eyelids are normal in appearance without swelling or lesions.  Cardiac: well perfused, s1, s2, rrr  Respiratory: breathing comfortably on room air. no audible wheezing or stridor  Abdominal: nondistended, soft  MSK: Neck and back are without deformity, visible external skin changes, or signs of trauma. Curvature of the cervical, thoracic, and lumbar spine are within normal limits. no external signs of trauma. no swelling/lesions of any extremity, full passive rom of all extremities  Neurological: obtunded, withdrawing all extremities to pain  Psychiatric: Appropriate mood and affect. Good judgement and insight. No visual or auditory hallucinations.

## 2022-10-19 NOTE — ED ADULT NURSE NOTE - INTERVENTIONS DEFINITIONS
Call bell, personal items and telephone within reach/Physically safe environment: no spills, clutter or unnecessary equipment/Provide visual cue, wrist band, yellow gown, etc./Monitor gait and stability/Monitor for mental status changes and reorient to person, place, and time

## 2022-10-19 NOTE — ED PROVIDER NOTE - OBJECTIVE STATEMENT
32m unknown pmhx supposely presenting for left ankle, right knee pain sustained in a fall o october 4th. this is per chart review of triage note, patient not providing history to me, smells of alcohol and appears heavily intoxicated.

## 2022-10-19 NOTE — ED PROVIDER NOTE - PATIENT PORTAL LINK FT
You can access the FollowMyHealth Patient Portal offered by [facility] by registering at the following website: [email or web address]. By joining [employer]’s FollowMyHealth portal, you will also be able to view your health information using other applications (apps) compatible with our system.

## 2022-10-19 NOTE — ED PROVIDER NOTE - CLINICAL SUMMARY MEDICAL DECISION MAKING FREE TEXT BOX
likely intoxicated, will obtai xrays of areas patient told triage nurse are in pain, will obtain ct head to evaluate for possible intracrnial trauma    patient observed in ED overnight, xrays/CT negative, now alert, ambulatory and toelrating po without difficulty. discharged

## 2022-10-19 NOTE — ED ADULT NURSE NOTE - OBJECTIVE STATEMENT
Pt presents to ED with left ankle pain starting after a fall on 10/4. Pt says pain is 5/10 with movement and 0/10 at rest. Sensation intact in lower extremities. No chest pain, shortness of breath, or fevers/chills.

## 2022-10-19 NOTE — ED ADULT NURSE NOTE - NSICDXPASTMEDICALHX_GEN_ALL_CORE_FT
PAST MEDICAL HISTORY:  Lupus     No pertinent past medical history     No pertinent past medical history
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
## 2022-10-21 DIAGNOSIS — M25.572 PAIN IN LEFT ANKLE AND JOINTS OF LEFT FOOT: ICD-10-CM

## 2022-10-21 DIAGNOSIS — R40.4 TRANSIENT ALTERATION OF AWARENESS: ICD-10-CM

## 2022-10-21 DIAGNOSIS — M25.561 PAIN IN RIGHT KNEE: ICD-10-CM

## 2022-10-21 DIAGNOSIS — Z91.013 ALLERGY TO SEAFOOD: ICD-10-CM

## 2022-10-21 DIAGNOSIS — F10.920 ALCOHOL USE, UNSPECIFIED WITH INTOXICATION, UNCOMPLICATED: ICD-10-CM

## 2022-10-21 DIAGNOSIS — M32.9 SYSTEMIC LUPUS ERYTHEMATOSUS, UNSPECIFIED: ICD-10-CM
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
## 2023-08-11 ENCOUNTER — EMERGENCY (EMERGENCY)
Facility: HOSPITAL | Age: 33
LOS: 1 days | Discharge: HOME | End: 2023-08-11
Attending: STUDENT IN AN ORGANIZED HEALTH CARE EDUCATION/TRAINING PROGRAM | Admitting: STUDENT IN AN ORGANIZED HEALTH CARE EDUCATION/TRAINING PROGRAM
Payer: MEDICAID

## 2023-08-11 VITALS
HEART RATE: 78 BPM | RESPIRATION RATE: 16 BRPM | SYSTOLIC BLOOD PRESSURE: 121 MMHG | DIASTOLIC BLOOD PRESSURE: 77 MMHG | OXYGEN SATURATION: 96 % | TEMPERATURE: 99 F

## 2023-08-11 DIAGNOSIS — Z91.013 ALLERGY TO SEAFOOD: ICD-10-CM

## 2023-08-11 DIAGNOSIS — F10.129 ALCOHOL ABUSE WITH INTOXICATION, UNSPECIFIED: ICD-10-CM

## 2023-08-11 DIAGNOSIS — M32.9 SYSTEMIC LUPUS ERYTHEMATOSUS, UNSPECIFIED: ICD-10-CM

## 2023-08-11 DIAGNOSIS — R41.82 ALTERED MENTAL STATUS, UNSPECIFIED: ICD-10-CM

## 2023-08-11 PROCEDURE — 99283 EMERGENCY DEPT VISIT LOW MDM: CPT

## 2023-08-11 NOTE — ED PROVIDER NOTE - OBJECTIVE STATEMENT
33 year old male, unknown pmh, per chart multiple visits for etoh intoxication, presenting with ams in setting of etoh use. [place] to ED via EMS, has no complaints here, states that he drank "a lot" of etoh. Denies other drug use. Denies recent trauma/fall. No chest/abdominal pain. No vomiting.

## 2023-08-11 NOTE — ED PROVIDER NOTE - CLINICAL SUMMARY MEDICAL DECISION MAKING FREE TEXT BOX
33 year old male, unknown pmh, per chart multiple visits for etoh intoxication, presenting with ams in setting of etoh use. Overall comfortable here without any complaints, grossly intoxicated but following all commands, moves all extremities, no evidence of head trauma, equal pupils, vitals wnl stable. Fingerstick reassuring. Will monitor for clinical sobriety and reassess for safe dc.

## 2023-08-11 NOTE — ED PROVIDER NOTE - PROGRESS NOTE DETAILS
Daniel David MD: Patient reassessed, now clinically sober, ambulatory without issues, mentating well. Haim HASTINGS

## 2023-08-11 NOTE — ED PROVIDER NOTE - PHYSICAL EXAMINATION
Gen - Grossly intoxicated but answering questions and following all commands  HEENT - NCAT, EOMI, moist mucous membranes, clear oropharynx  Neck - supple, no c-spine tenderness  Resp - CTAB, no increased WOB  CV -  RRR, no m/r/g  Abd - soft, NT, ND; no guarding or rebound  Back - no midline, paraspinous, or CVA tenderness  MSK - FROM of b/l UE and LE, no gross deformities  Extrem - no LE edema/erythema/tenderness  Neuro - no focal motor or sensation deficits, somewhat limited due to intoxication  Skin - warm, well perfused, no wounds
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
## 2023-08-11 NOTE — ED PROVIDER NOTE - PATIENT PORTAL LINK FT
You can access the FollowMyHealth Patient Portal offered by [facility] by registering at the following website: [email or web address]. By joining [facility]’s FollowMyHealth portal, you will also be able to view your health information using other applications (apps) compatible with our system.

## 2023-08-11 NOTE — ED ADULT NURSE NOTE - NSFALLRISKINTERV_ED_ALL_ED
Assistance OOB with selected safe patient handling equipment if applicable/Assistance with ambulation/Communicate fall risk and risk factors to all staff, patient, and family/Monitor gait and stability/Monitor for mental status changes and reorient to person, place, and time, as needed/Provide visual cue: yellow wristband, yellow gown, etc/Reinforce activity limits and safety measures with patient and family/Toileting schedule using arm’s reach rule for commode and bathroom/Use of alarms - bed, stretcher, chair and/or video monitoring/Call bell, personal items and telephone in reach/Instruct patient to call for assistance before getting out of bed/chair/stretcher/Non-slip footwear applied when patient is off stretcher/[place] to call system/Physically safe environment - no spills, clutter or unnecessary equipment/Purposeful Proactive Rounding/Room/bathroom lighting operational, light cord in reach

## 2023-08-11 NOTE — ED PROVIDER NOTE - NSFOLLOWUPINSTRUCTIONS_ED_ALL_ED_FT
You were seen in the Emergency Department for: alcohol intoxication    Please follow up with your primary physician. If you do not have a primary physician or specialist of your needs, please call [phone] to find one convenient for you. At this number you will be able to locate a provider who accepts your insurance, as well as locate the right specialist for your needs.    You should return to the Emergency Department if you feel any new/worsening/persistent symptoms including but not limited to: chest pain, difficulty breathing, loss of consciousness, bleeding, uncontrolled pain, numbness/weakness of a body part

## 2023-08-11 NOTE — ED ADULT NURSE NOTE - NSSUHOSCREENINGYN_ED_ALL_ED
Presents with complaint of weakness over last couple of days.  Noted by [place] since Thursday.  Weakness and not walking as well.  Needed a lot of assistance to walk to ambulance cart.   
Yes - the patient is able to be screened

## 2023-08-12 VITALS
SYSTOLIC BLOOD PRESSURE: 107 MMHG | DIASTOLIC BLOOD PRESSURE: 70 MMHG | OXYGEN SATURATION: 99 % | RESPIRATION RATE: 18 BRPM | HEART RATE: 75 BPM | TEMPERATURE: 98 F

## 2023-08-12 PROCEDURE — 99285 EMERGENCY DEPT VISIT HI MDM: CPT

## 2023-08-12 PROCEDURE — 82962 GLUCOSE BLOOD TEST: CPT

## 2023-08-12 RX ORDER — ACETAMINOPHEN 500 MG
650 TABLET ORAL ONCE
Refills: 0 | Status: COMPLETED | OUTPATIENT
Start: 2023-08-12 | End: 2023-08-12

## 2023-08-12 RX ADMIN — Medication 650 MILLIGRAM(S): at 00:16

## 2023-08-24 ENCOUNTER — EMERGENCY (EMERGENCY)
Facility: HOSPITAL | Age: 33
LOS: 1 days | Discharge: HOME | End: 2023-08-24
Attending: STUDENT IN AN ORGANIZED HEALTH CARE EDUCATION/TRAINING PROGRAM | Admitting: EMERGENCY MEDICINE
Payer: MEDICAID

## 2023-08-24 VITALS
TEMPERATURE: 98 F | WEIGHT: 169.98 LBS | SYSTOLIC BLOOD PRESSURE: 113 MMHG | HEART RATE: 76 BPM | OXYGEN SATURATION: 99 % | DIASTOLIC BLOOD PRESSURE: 81 MMHG | RESPIRATION RATE: 16 BRPM
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
## 2023-08-24 DIAGNOSIS — M32.9 SYSTEMIC LUPUS ERYTHEMATOSUS, UNSPECIFIED: ICD-10-CM

## 2023-08-24 DIAGNOSIS — Z91.013 ALLERGY TO SEAFOOD: ICD-10-CM

## 2023-08-24 DIAGNOSIS — F10.129 ALCOHOL ABUSE WITH INTOXICATION, UNSPECIFIED: ICD-10-CM

## 2023-08-24 DIAGNOSIS — Z87.891 PERSONAL HISTORY OF NICOTINE DEPENDENCE: ICD-10-CM

## 2023-08-24 DIAGNOSIS — S09.90XA UNSPECIFIED INJURY OF HEAD, INITIAL ENCOUNTER: ICD-10-CM

## 2023-08-24 PROCEDURE — 70450 CT HEAD/BRAIN W/O DYE: CPT | Mod: 26,MA

## 2023-08-24 PROCEDURE — 99284 EMERGENCY DEPT VISIT MOD MDM: CPT

## 2023-08-24 PROCEDURE — 72125 CT NECK SPINE W/O DYE: CPT | Mod: MA

## 2023-08-24 PROCEDURE — 82962 GLUCOSE BLOOD TEST: CPT

## 2023-08-24 PROCEDURE — 70450 CT HEAD/BRAIN W/O DYE: CPT | Mod: MA

## 2023-08-24 PROCEDURE — 72125 CT NECK SPINE W/O DYE: CPT | Mod: 26,MA

## 2023-08-24 PROCEDURE — 99285 EMERGENCY DEPT VISIT HI MDM: CPT | Mod: 25

## 2023-08-24 NOTE — ED PROVIDER NOTE - NSFOLLOWUPINSTRUCTIONS_ED_ALL_ED_FT
Follow up with your primary medical doctor as soon as possible.    Return to the emergency department if your symptoms worsen or if you develop new symptoms.  If you have any problems with followup, please call the ED Referral Coordinator at [phone].    Alcohol Abuse    Alcohol intoxication occurs when the amount of alcohol that a person has consumed impairs his or her ability to mentally and physically function. Chronic alcohol consumption can also lead to a variety of health issues including neurological disease, stomach disease, heart disease, liver disease, etc. Do not drive after drinking alcohol. Drinking enough alcohol to end up in an Emergency Room suggests you may have an alcohol abuse problem. Seek help at a drug addiction center.    SEEK IMMEDIATE MEDICAL CARE IF YOU HAVE ANY OF THE FOLLOWING SYMPTOMS: seizures, vomiting blood, blood in your stool, lightheadedness/dizziness, or becoming shaky to tremulous when you stop drinking.

## 2023-08-24 NOTE — ED ADULT NURSE NOTE - NSFALLHARMRISKINTERV_ED_ALL_ED
Assistance OOB with selected safe patient handling equipment if applicable/Assistance with ambulation/Communicate risk of Fall with Harm to all staff, patient, and family/Monitor gait and stability/Monitor for mental status changes and reorient to person, place, and time, as needed/Move patient closer to nursing station/within visual sight of ED staff/Provide visual cue: red socks, yellow wristband, yellow gown, etc/Reinforce activity limits and safety measures with patient and family/Toileting schedule using arm’s reach rule for commode and bathroom/Use of alarms - bed, stretcher, chair and/or video monitoring/Bed in lowest position, wheels locked, appropriate side rails in place/Call bell, personal items and telephone in reach/Instruct patient to call for assistance before getting out of bed/chair/stretcher/Non-slip footwear applied when patient is off stretcher/[place] to call system/Physically safe environment - no spills, clutter or unnecessary equipment/Purposeful Proactive Rounding/Room/bathroom lighting operational, light cord in reach

## 2023-08-24 NOTE — ED PROVIDER NOTE - PROGRESS NOTE DETAILS
Pt observed for sobriety. At time of discharge, patient is A&OX3, calm and cooperative, ambulatory with steady gait, tolerating PO.

## 2023-08-24 NOTE — ED PROVIDER NOTE - PATIENT PORTAL LINK FT
You can access the FollowMyHealth Patient Portal offered by [facility] by registering at the following website: [email or web address]. By joining [employer]’s FollowMyHealth portal, you will also be able to view your health information using other applications (apps) compatible with our system.

## 2023-08-24 NOTE — ED ADULT TRIAGE NOTE - CHIEF COMPLAINT QUOTE
Pt BIB EMS after bystanders called because pt was sleeping on ground by their apartment. PT endorses drinking 10 small bottles of hard liquor, pt denies drug use. PT AAOx4, ambulatory with steady gait.

## 2023-08-24 NOTE — ED PROVIDER NOTE - NS ED ROS FT
CONSTITUTIONAL: No fever, no chills, no fatigue, + intoxication  EYES: No eye redness, no visual changes  ENT: No ear pain, no sore throat  CARDIOVASCULAR: No chest pain, no palpitations  RESPIRATORY: No cough, no SOB  GI: No abdominal pain, no nausea, no vomiting, no constipation, no diarrhea  GENITOURINARY: No dysuria, no frequency, no hematuria  MUSCULOSKELETAL: No back pain, no joint pain, no myalgias  SKIN: No rash, no peripheral edema  NEURO: No headache, no confusion    ALL OTHER SYSTEMS NEGATIVE.

## 2023-08-24 NOTE — ED PROVIDER NOTE - PHYSICAL EXAMINATION
CONSTITUTIONAL: Well-appearing; well-nourished; no apparent distress. Intoxicated.  HEAD: Normocephalic; + mild L-sided facial swelling of cheek  EYES: PERRL; EOMI; sclera anicteric.  ENT: Normal pharynx; mucous membranes pink/moist, no erythema.  NECK: Supple; no meningeal signs  CARD: Regular rate and rhythm; normal S1 and S2; no murmurs, rubs or gallops.  RESP: Respiratory rate and effort are normal; breath sounds clear and equal bilaterally.  ABD: Non-distended; normal bowel sounds; soft; non-tender; no masses; no palpable organomegaly.  MSK/EXT: No gross deformities; full range of motion.  SKIN: Warm and dry; normal for age and race.  NEURO: Moving all four, intoxicated.

## 2023-08-24 NOTE — ED PROVIDER NOTE - OBJECTIVE STATEMENT
34 yo M w PMH of SLE, BIBEMS for alcohol intoxication. Pt appears intoxicated. States he fell earlier today + L-sided head trauma. No AC use. He is a daily drinker and has had withdrawal symptoms in the past. No seizures. Pt denies SI, HI, or AVH. No stated drug use. No other complaints.

## 2023-08-24 NOTE — ED ADULT NURSE NOTE - OBJECTIVE STATEMENT
33yoM BIBEMS after bystanders called 911 when they saw him passed out in front of their apartment. 33yoM BIBEMS after bystanders called 911 when they saw him passed out in front of their apartment. Pt stated her drank 10 mini bottles of hard liquor, pt states he does drink everyday and has gone through the withdrawal symptoms before. Pt ambulated to the bathroom with steady gait. No s/s of withdrawal.

## 2023-08-24 NOTE — ED PROVIDER NOTE - CLINICAL SUMMARY MEDICAL DECISION MAKING FREE TEXT BOX
Pt p/w self-admitted ETOH intoxication. + fall w head trauma. No SI, HI, coingestions or drug abuse. No indication of psychiatric complication. No evidence of withdrawal at initial evaluation. Will CT head and C-spine, obtain basic labs, and reassess after a period of metabolism. If lainey and without emergent complaint or finding, will be safe for discharge. Pt p/w self-admitted ETOH intoxication. + fall w head trauma. No SI, HI, coingestions or drug abuse. No indication of psychiatric complication. No evidence of withdrawal at initial evaluation. Will CT head and C-spine and reassess after a period of metabolism. If lainey and without emergent complaint or finding, will be safe for discharge.

## 2023-08-25 VITALS
DIASTOLIC BLOOD PRESSURE: 61 MMHG | HEART RATE: 55 BPM | OXYGEN SATURATION: 99 % | SYSTOLIC BLOOD PRESSURE: 104 MMHG | TEMPERATURE: 98 F | RESPIRATION RATE: 18 BRPM

## 2023-10-01 ENCOUNTER — HOSPITAL ENCOUNTER (INPATIENT)
Dept: HOSPITAL 74 - YASAS | Age: 33
LOS: 3 days | Discharge: LEFT BEFORE DISCHARGE | DRG: 770 | End: 2023-10-04
Attending: SURGERY | Admitting: ALLERGY & IMMUNOLOGY
Payer: COMMERCIAL

## 2023-10-01 VITALS — BODY MASS INDEX: 25.4 KG/M2

## 2023-10-01 DIAGNOSIS — F10.280: ICD-10-CM

## 2023-10-01 DIAGNOSIS — E87.6: ICD-10-CM

## 2023-10-01 DIAGNOSIS — R74.8: ICD-10-CM

## 2023-10-01 DIAGNOSIS — F17.210: ICD-10-CM

## 2023-10-01 DIAGNOSIS — Z56.0: ICD-10-CM

## 2023-10-01 DIAGNOSIS — F10.230: Primary | ICD-10-CM

## 2023-10-01 DIAGNOSIS — Z59.00: ICD-10-CM

## 2023-10-01 DIAGNOSIS — F10.282: ICD-10-CM

## 2023-10-01 DIAGNOSIS — D72.819: ICD-10-CM

## 2023-10-01 PROCEDURE — HZ2ZZZZ DETOXIFICATION SERVICES FOR SUBSTANCE ABUSE TREATMENT: ICD-10-PCS | Performed by: SURGERY

## 2023-10-01 RX ADMIN — Medication SCH MG: at 22:19

## 2023-10-01 RX ADMIN — ERYTHROMYCIN SCH APPLIC: 5 OINTMENT OPHTHALMIC at 15:22

## 2023-10-01 RX ADMIN — QUETIAPINE FUMARATE SCH MG: 100 TABLET ORAL at 22:19

## 2023-10-01 RX ADMIN — METHOCARBAMOL PRN MG: 500 TABLET ORAL at 19:34

## 2023-10-01 SDOH — ECONOMIC STABILITY - HOUSING INSECURITY: HOMELESSNESS UNSPECIFIED: Z59.00

## 2023-10-01 SDOH — ECONOMIC STABILITY - INCOME SECURITY: UNEMPLOYMENT, UNSPECIFIED: Z56.0

## 2023-10-02 LAB
ALBUMIN SERPL-MCNC: 3 G/DL (ref 3.4–5)
ALP SERPL-CCNC: 131 U/L (ref 45–117)
ALT SERPL-CCNC: 88 U/L (ref 13–61)
ANION GAP SERPL CALC-SCNC: 7 MMOL/L (ref 8–16)
AST SERPL-CCNC: 232 U/L (ref 15–37)
BILIRUB SERPL-MCNC: 1.8 MG/DL (ref 0.2–1)
BUN SERPL-MCNC: 12.3 MG/DL (ref 7–18)
CALCIUM SERPL-MCNC: 8.8 MG/DL (ref 8.5–10.1)
CHLORIDE SERPL-SCNC: 103 MMOL/L (ref 98–107)
CO2 SERPL-SCNC: 29 MMOL/L (ref 21–32)
CREAT SERPL-MCNC: 0.6 MG/DL (ref 0.55–1.3)
DEPRECATED RDW RBC AUTO: 18.6 % (ref 11.9–15.9)
GLUCOSE SERPL-MCNC: 94 MG/DL (ref 74–106)
HCT VFR BLD CALC: 35.3 % (ref 35.4–49)
HGB BLD-MCNC: 12.5 GM/DL (ref 11.7–16.9)
MCH RBC QN AUTO: 34 PG (ref 25.7–33.7)
MCHC RBC AUTO-ENTMCNC: 35.4 G/DL (ref 32–35.9)
MCV RBC: 96 FL (ref 80–96)
PLATELET # BLD AUTO: 93 10^3/UL (ref 134–434)
PMV BLD: 7.6 FL (ref 7.5–11.1)
POTASSIUM SERPLBLD-SCNC: 2.9 MMOL/L (ref 3.5–5.1)
PROT SERPL-MCNC: 6.7 G/DL (ref 6.4–8.2)
RBC # BLD AUTO: 3.68 M/MM3 (ref 4–5.6)
SODIUM SERPL-SCNC: 139 MMOL/L (ref 136–145)
WBC # BLD AUTO: 2.3 K/MM3 (ref 4–10)

## 2023-10-02 RX ADMIN — NICOTINE SCH: 21 PATCH TRANSDERMAL at 10:41

## 2023-10-02 RX ADMIN — ERYTHROMYCIN SCH APPLIC: 5 OINTMENT OPHTHALMIC at 10:39

## 2023-10-02 RX ADMIN — POTASSIUM CHLORIDE SCH MEQ: 20 SOLUTION ORAL at 22:08

## 2023-10-02 RX ADMIN — NICOTINE POLACRILEX PRN MG: 2 GUM, CHEWING ORAL at 17:32

## 2023-10-02 RX ADMIN — Medication SCH MG: at 22:07

## 2023-10-02 RX ADMIN — QUETIAPINE FUMARATE SCH MG: 100 TABLET ORAL at 22:07

## 2023-10-02 RX ADMIN — Medication SCH TAB: at 10:38

## 2023-10-03 LAB
ALBUMIN SERPL-MCNC: 3 G/DL (ref 3.4–5)
ALP SERPL-CCNC: 138 U/L (ref 45–117)
ALT SERPL-CCNC: 93 U/L (ref 13–61)
ANION GAP SERPL CALC-SCNC: 8 MMOL/L (ref 8–16)
AST SERPL-CCNC: 177 U/L (ref 15–37)
BILIRUB SERPL-MCNC: 1.2 MG/DL (ref 0.2–1)
BUN SERPL-MCNC: 11.1 MG/DL (ref 7–18)
CALCIUM SERPL-MCNC: 8.9 MG/DL (ref 8.5–10.1)
CHLORIDE SERPL-SCNC: 105 MMOL/L (ref 98–107)
CO2 SERPL-SCNC: 25 MMOL/L (ref 21–32)
CREAT SERPL-MCNC: 0.7 MG/DL (ref 0.55–1.3)
DEPRECATED RDW RBC AUTO: 18.6 % (ref 11.9–15.9)
GLUCOSE SERPL-MCNC: 100 MG/DL (ref 74–106)
HCT VFR BLD CALC: 36.4 % (ref 35.4–49)
HGB BLD-MCNC: 12.2 GM/DL (ref 11.7–16.9)
MCH RBC QN AUTO: 33.2 PG (ref 25.7–33.7)
MCHC RBC AUTO-ENTMCNC: 33.6 G/DL (ref 32–35.9)
MCV RBC: 98.9 FL (ref 80–96)
PLATELET # BLD AUTO: 87 10^3/UL (ref 134–434)
PMV BLD: 7.8 FL (ref 7.5–11.1)
POTASSIUM SERPLBLD-SCNC: 3.6 MMOL/L (ref 3.5–5.1)
PROT SERPL-MCNC: 6.5 G/DL (ref 6.4–8.2)
RBC # BLD AUTO: 3.68 M/MM3 (ref 4–5.6)
SODIUM SERPL-SCNC: 138 MMOL/L (ref 136–145)
WBC # BLD AUTO: 2.5 K/MM3 (ref 4–10)

## 2023-10-03 RX ADMIN — Medication SCH MG: at 22:16

## 2023-10-03 RX ADMIN — NICOTINE SCH: 21 PATCH TRANSDERMAL at 10:25

## 2023-10-03 RX ADMIN — NICOTINE POLACRILEX PRN MG: 2 GUM, CHEWING ORAL at 19:43

## 2023-10-03 RX ADMIN — POTASSIUM CHLORIDE SCH MEQ: 20 SOLUTION ORAL at 10:22

## 2023-10-03 RX ADMIN — NICOTINE POLACRILEX PRN MG: 2 GUM, CHEWING ORAL at 10:22

## 2023-10-03 RX ADMIN — METHOCARBAMOL PRN MG: 500 TABLET ORAL at 20:58

## 2023-10-03 RX ADMIN — QUETIAPINE FUMARATE SCH MG: 100 TABLET ORAL at 22:16

## 2023-10-03 RX ADMIN — POTASSIUM CHLORIDE SCH MEQ: 20 SOLUTION ORAL at 22:16

## 2023-10-03 RX ADMIN — ERYTHROMYCIN SCH APPLIC: 5 OINTMENT OPHTHALMIC at 10:24

## 2023-10-03 RX ADMIN — Medication SCH MG: at 22:15

## 2023-10-03 RX ADMIN — Medication SCH TAB: at 10:21

## 2023-10-04 VITALS
DIASTOLIC BLOOD PRESSURE: 89 MMHG | RESPIRATION RATE: 16 BRPM | SYSTOLIC BLOOD PRESSURE: 137 MMHG | HEART RATE: 82 BPM | TEMPERATURE: 97.8 F

## 2023-10-04 RX ADMIN — ERYTHROMYCIN SCH: 5 OINTMENT OPHTHALMIC at 09:45

## 2023-10-04 RX ADMIN — NICOTINE SCH: 21 PATCH TRANSDERMAL at 09:45

## 2023-10-04 RX ADMIN — Medication SCH TAB: at 09:42

## 2023-10-04 RX ADMIN — POTASSIUM CHLORIDE SCH MEQ: 20 SOLUTION ORAL at 09:42

## 2024-12-30 ENCOUNTER — APPOINTMENT (OUTPATIENT)
Dept: OPHTHALMOLOGY | Facility: CLINIC | Age: 34
End: 2024-12-30
Payer: MEDICAID

## 2024-12-30 ENCOUNTER — NON-APPOINTMENT (OUTPATIENT)
Age: 34
End: 2024-12-30

## 2024-12-30 PROCEDURE — 92004 COMPRE OPH EXAM NEW PT 1/>: CPT

## 2024-12-30 NOTE — ED ADULT NURSE NOTE - CAS DISCH CONDITION

## 2024-12-31 ENCOUNTER — TRANSCRIPTION ENCOUNTER (OUTPATIENT)
Age: 34
End: 2024-12-31

## 2025-01-08 ENCOUNTER — APPOINTMENT (OUTPATIENT)
Dept: OPHTHALMOLOGY | Facility: CLINIC | Age: 35
End: 2025-01-08
Payer: MEDICAID

## 2025-01-08 ENCOUNTER — NON-APPOINTMENT (OUTPATIENT)
Age: 35
End: 2025-01-08

## 2025-01-08 PROCEDURE — 92014 COMPRE OPH EXAM EST PT 1/>: CPT

## 2025-01-28 PROBLEM — Z00.00 ENCOUNTER FOR PREVENTIVE HEALTH EXAMINATION: Status: ACTIVE | Noted: 2025-01-28

## 2025-02-21 ENCOUNTER — RESULT REVIEW (OUTPATIENT)
Age: 35
End: 2025-02-21

## 2025-02-21 ENCOUNTER — OUTPATIENT (OUTPATIENT)
Dept: OUTPATIENT SERVICES | Facility: HOSPITAL | Age: 35
LOS: 1 days | End: 2025-02-21
Payer: MEDICAID

## 2025-02-21 ENCOUNTER — APPOINTMENT (OUTPATIENT)
Dept: MRI IMAGING | Facility: HOSPITAL | Age: 35
End: 2025-02-21
Payer: MEDICAID

## 2025-02-21 PROCEDURE — 71046 X-RAY EXAM CHEST 2 VIEWS: CPT

## 2025-02-21 PROCEDURE — 70553 MRI BRAIN STEM W/O & W/DYE: CPT | Mod: 26

## 2025-02-21 PROCEDURE — 71046 X-RAY EXAM CHEST 2 VIEWS: CPT | Mod: 26

## 2025-02-21 PROCEDURE — 70543 MRI ORBT/FAC/NCK W/O &W/DYE: CPT | Mod: 26

## 2025-02-21 PROCEDURE — 70543 MRI ORBT/FAC/NCK W/O &W/DYE: CPT

## 2025-02-21 PROCEDURE — A9585: CPT

## 2025-02-21 PROCEDURE — 70553 MRI BRAIN STEM W/O & W/DYE: CPT

## 2025-02-25 ENCOUNTER — NON-APPOINTMENT (OUTPATIENT)
Age: 35
End: 2025-02-25

## 2025-02-25 ENCOUNTER — APPOINTMENT (OUTPATIENT)
Dept: OPHTHALMOLOGY | Facility: CLINIC | Age: 35
End: 2025-02-25
Payer: MEDICAID

## 2025-02-25 PROCEDURE — 99213 OFFICE O/P EST LOW 20 MIN: CPT | Mod: 93

## 2025-03-13 ENCOUNTER — OUTPATIENT (OUTPATIENT)
Dept: OUTPATIENT SERVICES | Facility: HOSPITAL | Age: 35
LOS: 1 days | End: 2025-03-13
Payer: MEDICAID

## 2025-03-13 ENCOUNTER — APPOINTMENT (OUTPATIENT)
Dept: MRI IMAGING | Facility: HOSPITAL | Age: 35
End: 2025-03-13

## 2025-03-13 ENCOUNTER — RESULT REVIEW (OUTPATIENT)
Age: 35
End: 2025-03-13

## 2025-03-13 PROCEDURE — 70544 MR ANGIOGRAPHY HEAD W/O DYE: CPT

## 2025-03-13 PROCEDURE — 70544 MR ANGIOGRAPHY HEAD W/O DYE: CPT | Mod: 26

## 2025-04-22 ENCOUNTER — APPOINTMENT (OUTPATIENT)
Dept: OPHTHALMOLOGY | Facility: CLINIC | Age: 35
End: 2025-04-22

## 2025-04-22 ENCOUNTER — NON-APPOINTMENT (OUTPATIENT)
Age: 35
End: 2025-04-22

## 2025-04-22 PROCEDURE — 99213 OFFICE O/P EST LOW 20 MIN: CPT | Mod: NC,93

## 2025-04-30 NOTE — ED ADULT NURSE NOTE - NSIMPLEMENTINTERV_GEN_ALL_ED
ONABOTULINUM TOXIN (BOTOX) TREATMENT FOR CERVICAL DYSTONIA     Date of Service: 4/30/2025     Patient Name: Sanam Sandhu     Preop diagnosis: Cervical dystonia     Postop diagnosis: Cervical Dystonia    Procedure performed: Administration of Botox injections for cervical dystonia.     Indication: Patient is noted to have significant cervical dystonia which affects activities of daily living.    Limitatation with cervical extension, rotation worse on the left.  Spasm left SCM, trapezius muscles.  Left sided shoulder elevation and spasms present.     Last injection series: Botox A     Today's injection series:  # 1       Detailed description of Procedure (Botox Injections for Cervical Dystonia):   The risks and benefits of procedure were explained to the patient. Informed consent was obtained in the chart.  Patient was escorted to the procedure room and placed in the sitting position.    The skin overlying the areas of injection was cleansed with alcohol. Botox was injected as follows: 30 gauge 0.5 inch , (1.5 inch for Levator scapulae muscles) needles. After diluting with preservative free normal saline.  Botox 10 units per 0.1 ml of Normal Saline.       Ultrasound Guidance was saved with permanent recording and reporting.    Extremity Muscle Units mL Notes   LEFT Upper Trapezius 20         Sternocleidomastoid  20         Splenius capitis 20         Levator scapulae 40         Trapezius Belly 40    In 10 unit divided doses              RIGHT Trapezius belly 20    In 10 unit divided doses      Trapezius Upper  20        Sternocleidomastoid 30        Splenius capitis 20          Levator Scapulae  20                                     A total of  250  Units of Botox was injected during today’s appointment.   Amount wasted: 50 Units      Plan:   Return to clinic in 3 months for botox injections as indicated. 6 weeks Office follow up.   Implemented All Universal Safety Interventions:  [place] to call system. Call bell, personal items and telephone within reach. Instruct patient to call for assistance. Room bathroom lighting operational. Non-slip footwear when patient is off stretcher. Physically safe environment: no spills, clutter or unnecessary equipment. Stretcher in lowest position, wheels locked, appropriate side rails in place.

## 2025-08-04 ENCOUNTER — EMERGENCY (EMERGENCY)
Facility: HOSPITAL | Age: 35
LOS: 1 days | End: 2025-08-04
Attending: EMERGENCY MEDICINE | Admitting: EMERGENCY MEDICINE
Payer: MEDICAID

## 2025-08-04 VITALS
WEIGHT: 164.91 LBS | TEMPERATURE: 99 F | SYSTOLIC BLOOD PRESSURE: 122 MMHG | RESPIRATION RATE: 16 BRPM | HEIGHT: 66 IN | HEART RATE: 100 BPM | OXYGEN SATURATION: 97 % | DIASTOLIC BLOOD PRESSURE: 72 MMHG

## 2025-08-04 PROCEDURE — 70486 CT MAXILLOFACIAL W/O DYE: CPT | Mod: 26

## 2025-08-04 PROCEDURE — 72125 CT NECK SPINE W/O DYE: CPT | Mod: 26

## 2025-08-04 PROCEDURE — 70450 CT HEAD/BRAIN W/O DYE: CPT | Mod: 26

## 2025-08-04 PROCEDURE — 82962 GLUCOSE BLOOD TEST: CPT

## 2025-08-04 PROCEDURE — 99285 EMERGENCY DEPT VISIT HI MDM: CPT

## 2025-08-05 VITALS
SYSTOLIC BLOOD PRESSURE: 120 MMHG | DIASTOLIC BLOOD PRESSURE: 68 MMHG | RESPIRATION RATE: 18 BRPM | OXYGEN SATURATION: 98 % | HEART RATE: 78 BPM | TEMPERATURE: 98 F

## 2025-08-05 PROCEDURE — 72125 CT NECK SPINE W/O DYE: CPT

## 2025-08-05 PROCEDURE — 70486 CT MAXILLOFACIAL W/O DYE: CPT

## 2025-08-05 PROCEDURE — 99285 EMERGENCY DEPT VISIT HI MDM: CPT | Mod: 25

## 2025-08-05 PROCEDURE — 93010 ELECTROCARDIOGRAM REPORT: CPT

## 2025-08-05 PROCEDURE — 93005 ELECTROCARDIOGRAM TRACING: CPT

## 2025-08-05 PROCEDURE — 82962 GLUCOSE BLOOD TEST: CPT

## 2025-08-05 PROCEDURE — 70450 CT HEAD/BRAIN W/O DYE: CPT

## 2025-08-08 DIAGNOSIS — F10.129 ALCOHOL ABUSE WITH INTOXICATION, UNSPECIFIED: ICD-10-CM

## 2025-08-08 DIAGNOSIS — S00.81XA ABRASION OF OTHER PART OF HEAD, INITIAL ENCOUNTER: ICD-10-CM
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
## 2025-08-08 DIAGNOSIS — R41.82 ALTERED MENTAL STATUS, UNSPECIFIED: ICD-10-CM
